# Patient Record
Sex: FEMALE | Race: WHITE | NOT HISPANIC OR LATINO | Employment: OTHER | URBAN - METROPOLITAN AREA
[De-identification: names, ages, dates, MRNs, and addresses within clinical notes are randomized per-mention and may not be internally consistent; named-entity substitution may affect disease eponyms.]

---

## 2017-01-05 ENCOUNTER — DOCUMENTATION ONLY (OUTPATIENT)
Dept: NEUROLOGY | Facility: CLINIC | Age: 42
End: 2017-01-05

## 2017-01-05 NOTE — PROGRESS NOTES
Received Brain MRI (9/15/16) [facility] on 1/5/2016.  Placed in AP folder for review

## 2017-01-10 ENCOUNTER — TELEPHONE (OUTPATIENT)
Dept: NEUROLOGY | Facility: CLINIC | Age: 42
End: 2017-01-10

## 2017-01-10 NOTE — TELEPHONE ENCOUNTER
----- Message from Elsie Jules sent at 1/9/2017  4:04 PM CST -----  Contact: Pt [phone]  Pt is returning Licha's call.

## 2017-01-12 ENCOUNTER — TELEPHONE (OUTPATIENT)
Dept: NEUROLOGY | Facility: CLINIC | Age: 42
End: 2017-01-12

## 2017-01-12 NOTE — TELEPHONE ENCOUNTER
"Received notes from Dr. Higgins from office visit with patient on 1/11/17.  Dr. Higgins states that he will follow up with Dr. Juan after our next visit with Ant on 2/1/16. He states "If demyelinating workup is negative, I will consider sending a paraneoplastic antibody panel."     Celiac panel negative.   Myasthena gravis panel negative.   "

## 2017-01-31 ENCOUNTER — TELEPHONE (OUTPATIENT)
Dept: NEUROLOGY | Facility: CLINIC | Age: 42
End: 2017-01-31

## 2017-01-31 NOTE — TELEPHONE ENCOUNTER
----- Message from Corinne Zeng sent at 1/31/2017 10:56 AM CST -----  Contact: cecile (mother) @ [phone] or [phone]  Pt is scheduled to f/u with dr wynn on tomorrow.  Pt is not feeling well and would like to reschedule for Monday morning if possible.  pls call with a new appt.

## 2017-02-01 ENCOUNTER — TELEPHONE (OUTPATIENT)
Dept: NEUROLOGY | Facility: CLINIC | Age: 42
End: 2017-02-01

## 2017-02-01 ENCOUNTER — OFFICE VISIT (OUTPATIENT)
Dept: NEUROLOGY | Facility: CLINIC | Age: 42
End: 2017-02-01
Payer: COMMERCIAL

## 2017-02-01 VITALS
BODY MASS INDEX: 21.38 KG/M2 | HEIGHT: 58 IN | DIASTOLIC BLOOD PRESSURE: 89 MMHG | SYSTOLIC BLOOD PRESSURE: 113 MMHG | WEIGHT: 101.88 LBS

## 2017-02-01 DIAGNOSIS — H53.2 DOUBLE VISION: Primary | ICD-10-CM

## 2017-02-01 DIAGNOSIS — R41.89 SUBJECTIVE MEMORY COMPLAINTS: ICD-10-CM

## 2017-02-01 DIAGNOSIS — R26.9 GAIT DISTURBANCE: ICD-10-CM

## 2017-02-01 DIAGNOSIS — R47.89 WORD FINDING DIFFICULTY: ICD-10-CM

## 2017-02-01 DIAGNOSIS — H55.00 NYSTAGMUS: ICD-10-CM

## 2017-02-01 PROCEDURE — 99215 OFFICE O/P EST HI 40 MIN: CPT | Mod: S$GLB,,, | Performed by: PSYCHIATRY & NEUROLOGY

## 2017-02-01 PROCEDURE — 99999 PR PBB SHADOW E&M-EST. PATIENT-LVL III: CPT | Mod: PBBFAC,,, | Performed by: PSYCHIATRY & NEUROLOGY

## 2017-02-01 RX ORDER — CHOLECALCIFEROL (VITAMIN D3) 25 MCG
10000 TABLET ORAL DAILY
COMMUNITY
End: 2019-03-20 | Stop reason: DRUGHIGH

## 2017-02-01 NOTE — PROGRESS NOTES
Subjective:      ·  Patient ID: Ant Martinez is a 41 y.o. female who presents today for possible MS. She is accompanied by both aunts and her mother.   She continues to have sx of fatigue, memory difficulties, double vision, depression, and some word finding difficulties.  She is now taking 10,000 IU of vitamin D3 daily.     Her aunt states that her speech improves a lot (normalizes) after she's had two alcoholic beverages    SOCIAL HISTORY  Social History   Substance Use Topics    Smoking status: Never Smoker    Smokeless tobacco: Never Used    Alcohol use No     Living arrangements - the patient lives with her parents.  Employment [employer]       Objective:      25 foot timed walk: 7.4 seconds; wide based;     Neurologic Exam    MENTAL STATUS: grossly intact  CRANIAL NERVE EXAM: There is mild rotatory nystagmus on right gaze that extinguishes. Extraocular   muscles are intact.  No facial asymmetry.There is no dysarthria.   MOTOR EXAM: Normal bulk and tone throughout UE and LE bilaterally. Rapid sequential movements are normal. Strength is 5/5 in all groups   in the lower extremities and upper extremities.   REFLEXES: Symmetric and 2+ throughout in all 4 extremities.   SENSORY EXAM: Normal to vibration t/o  COORDINATION: Normal finger-to-nose exam.   GAIT: very wide based; unusual; slow;     Imaging:   MRI brain with one 5mm lesion left temporal WM. Non-enhancing.  Mike positive    Labs:     Lab Results   Component Value Date    KETXPDAC27DP 13 (L) 12/27/2016         DIAGNOSIS  1. Double vision and nystagmus  2. Symptoms of cognitive dysfunction / dysphasia that are intermittent    DDX  Autoimmune encephalopathy, MS (doubt), anxiety    RECOMMENDATION  1. repeat MRI brain in March 2. Refer to Dr. Bateman  3. PT and ST therapy  4. NP with Dr. Cummins  5. Will send autoimmune encephalopathy profile    Over 50% of this 40 minute visit was spent in direct face to face counseling of the patient, her mother, and  her aunts about my diagnostic impression, and the rationale for w/u         Double vision  -     Encephalopathy Autoimmune Eval; Future; Expected date: 2/1/17  -     MRI Brain W WO Contrast; Future  -     NMO AQUAPORIN-4-IGG, SERUM; Future; Expected date: 2/1/17  -     Ambulatory Referral to Ophthalmology    Nystagmus  -     Encephalopathy Autoimmune Eval; Future; Expected date: 2/1/17  -     MRI Brain W WO Contrast; Future  -     NMO AQUAPORIN-4-IGG, SERUM; Future; Expected date: 2/1/17  -     Ambulatory Referral to Ophthalmology    Subjective memory complaints  -     Encephalopathy Autoimmune Eval; Future; Expected date: 2/1/17  -     MRI Brain W WO Contrast; Future  -     NMO AQUAPORIN-4-IGG, SERUM; Future; Expected date: 2/1/17  -     Ambulatory Referral to Neuropsychology  -     Ambulatory Referral to Speech Therapy    Word finding difficulty  -     Encephalopathy Autoimmune Eval; Future; Expected date: 2/1/17  -     MRI Brain W WO Contrast; Future  -     NMO AQUAPORIN-4-IGG, SERUM; Future; Expected date: 2/1/17  -     Ambulatory Referral to Neuropsychology  -     Ambulatory Referral to Speech Therapy    Gait disturbance  -     Ambulatory Referral to Physical/Occupational Therapy

## 2017-02-01 NOTE — Clinical Note
"STEVEN Oleary referring this pt to you.  She had new c/o of word finding difficulty and memory issues since Sept.  Work-up so far unrevealing, but I have not totally ruled out "organic" etiology (I think possible, but becoming less likely).  Her aunt told me today that her dysphasia improves after 2 martinis. She has a significant psych history as well.   Thanks  "

## 2017-02-06 ENCOUNTER — INITIAL CONSULT (OUTPATIENT)
Dept: OPHTHALMOLOGY | Facility: CLINIC | Age: 42
End: 2017-02-06
Payer: COMMERCIAL

## 2017-02-06 DIAGNOSIS — H50.15 ALTERNATING EXOTROPIA: ICD-10-CM

## 2017-02-06 DIAGNOSIS — H50.42 MONOFIXATION SYNDROME: ICD-10-CM

## 2017-02-06 PROCEDURE — 99999 PR PBB SHADOW E&M-EST. PATIENT-LVL II: CPT | Mod: PBBFAC,,, | Performed by: OPHTHALMOLOGY

## 2017-02-06 PROCEDURE — 92004 COMPRE OPH EXAM NEW PT 1/>: CPT | Mod: S$GLB,,, | Performed by: OPHTHALMOLOGY

## 2017-02-06 RX ORDER — ALPRAZOLAM 0.5 MG/1
TABLET ORAL
Refills: 1 | COMMUNITY
Start: 2016-12-23

## 2017-02-06 RX ORDER — DEXMETHYLPHENIDATE HYDROCHLORIDE 30 MG/1
1 CAPSULE, EXTENDED RELEASE ORAL EVERY MORNING
Refills: 0 | COMMUNITY
Start: 2017-01-09 | End: 2019-03-20

## 2017-02-06 RX ORDER — METHYLPHENIDATE HYDROCHLORIDE 54 MG/1
54 TABLET ORAL EVERY MORNING
Refills: 0 | COMMUNITY
Start: 2016-11-23 | End: 2017-02-06

## 2017-02-06 RX ORDER — ONDANSETRON 4 MG/1
TABLET, FILM COATED ORAL
Refills: 0 | COMMUNITY
Start: 2017-01-19 | End: 2019-03-20 | Stop reason: DRUGHIGH

## 2017-02-06 RX ORDER — PROCHLORPERAZINE MALEATE 10 MG
TABLET ORAL
Refills: 0 | COMMUNITY
Start: 2016-11-16 | End: 2017-02-06

## 2017-02-06 NOTE — LETTER
February 6, 2017      Deedee Juan MD  [address]  [facility] [address]           [facility] Ophthalmology  [address]  [facility] [address]  Phone: [phone]  Fax: [phone]          Patient: Ant Maritnez   MR Number: [id number]   YOB: 1975   Date of Visit: 2/6/2017       Dear Dr. Deedee Juan:    Thank you for referring Ant Martinez to me for evaluation. Attached you will find relevant portions of my assessment and plan of care.    If you have questions, please do not hesitate to call me. I look forward to following Ant Martinez along with you.    Sincerely,    Jens Bateman MD    Enclosure  CC:  No Recipients    If you would like to receive this communication electronically, please contact [email or web address] or [phone] to request more information on [employer] Link access.    For providers and/or their staff who would like to refer a patient to [facility], please contact us through our one-stop-shop provider referral line, [facility], at [phone].    If you feel you have received this communication in error or would no longer like to receive these types of communications, please e-mail [email or web address]

## 2017-02-06 NOTE — MR AVS SNAPSHOT
[facility]  [address]  [facility] [address]  Phone: [phone]  Fax: [phone]                  Ant Martinez   2017 10:00 AM   Initial consult    Description:  Female : 1975   Provider:  Jens Bateman MD   Department:  [facility]           Reason for Visit     Concerns About Ocular Health           Diagnoses this Visit        Comments    Monofixation syndrome         Alternating exotropia                To Do List           Future Appointments        Provider Department Dept Phone    3/17/2017 9:45 AM [facility] MRI2 [facility] [phone]    3/24/2017 9:00 AM Deedee Juan MD [facility]- Multiple Sclerosis [phone]      Goals (5 Years of Data)     None      Follow-Up and Disposition     Return in about 1 month (around 3/6/2017).      [facility]     [facility] Nurse Care Line -  Assistance  Registered nurses in the [facility] provide clinical advisement, health education, appointment booking, and other advisory services.  Call for this free service at [phone].             Medications           Message regarding Medications     Verify the changes and/or additions to your medication regime listed below are the same as discussed with your clinician today.  If any of these changes or additions are incorrect, please notify your healthcare provider.        STOP taking these medications     prochlorperazine (COMPAZINE) 10 MG tablet TAKE ONE TABLET BY MOUTH THREE TIMES A DAY if needed FOR HEADACHE WITH NAUSEA    methylphenidate (CONCERTA) 54 MG CR tablet Take 54 mg by mouth every morning.           Verify that the below list of medications is an accurate representation of the medications you are currently taking.  If none reported, the list may be blank. If incorrect, please contact your healthcare provider. Carry this list with you in case of emergency.           Current Medications     alprazolam (XANAX)  0.5 MG tablet TAKE ONE TABLET BY MOUTH THREE TIMES A DAY if needed    dexmethylphenidate (FOCALIN XR) 30 mg 24 hr capsule Take 1 capsule by mouth every morning.    lamotrigine (LAMICTAL) 25 MG tablet Take 25 mg by mouth once daily.    lisinopril (PRINIVIL,ZESTRIL) 20 MG tablet Take 20 mg by mouth once daily.    ondansetron (ZOFRAN) 4 MG tablet TAKE ONE TABLET BY MOUTH EVERY 4 HOURS if needed FOR NAUSEA & VOMITING    quetiapine (SEROQUEL) 25 MG Tab Take by mouth.    vitamin D 1000 units Tab Take 10,000 mg by mouth once daily.           Clinical Reference Information           Allergies as of 2/6/2017     No Known Allergies      Immunizations Administered on Date of Encounter - 2/6/2017     None      [facility] Sign-Up     Activating your [facility] account is as easy as 1-2-3!     1) Visit [email or web address], select Sign Up Now, enter this activation code and your date of birth, then select Next.  [email or web address]  Expires: 3/18/2017 10:15 AM      2) Create a username and password to use when you visit [facility] in the future and select a security question in case you lose your password and select Next.    3) Enter your e-mail address and click Sign Up!    Additional Information  If you have questions, please e-mail [email or web address] or call [phone] to talk to our [facility] staff. Remember, [facility] is NOT to be used for urgent needs. For medical emergencies, dial 911.         Instructions    Temporary prism.  Return in one month.       Language Assistance Services     ATTENTION: Language assistance services are available, free of charge. Please call [phone].      ATENCIÓN: Si habla español, tiene a terry disposición servicios gratuitos de asistencia lingüística. Llame al [phone].     ARGELIA Ý: N?u b?n nói Ti?ng Vi?t, có các d?ch v? h? tr? ngôn ng? mi?n phí dành cho b?n. G?i s? [phone].         [facility] complies with applicable Federal civil rights laws and does not discriminate on  the basis of race, color, national origin, age, disability, or sex.

## 2017-02-06 NOTE — PROGRESS NOTES
HPI     Concerns About Ocular Health    Additional comments: Double vision           Comments   Referred by    Patient states experiencing double vision side by side since 09/12/2016   which seem to be constant. Pt states had a few episodes of double vision   before but seem to have improved quickly.  Have experience some headaches   not sure if its her eyes.  No pain.    I have personally interviewed the patient, reviewed the history and   examined the patient and agree with the technician's exam.    The diplopia is intermittent, binocular, and horizontal. The degree of   double vision is not affected by direction of gaze. The degree of   intensity varies. She has a history of a lazy eye and wore a patch for a   while. Her eyes may wander at times.       Last edited by Jens Bateman MD on 2/6/2017 10:50 AM. (History)        ROS     Positive for: Neurological, Cardiovascular, Eyes    Negative for: Constitutional, Gastrointestinal, Skin, Genitourinary,   Musculoskeletal, HENT, Endocrine, Respiratory, Psychiatric, Allergic/Imm,   Heme/Lymph    Last edited by Jens Bateman MD on 2/6/2017 10:33 AM. (History)        Assessment /Plan     For exam results, see Encounter Report.    Monofixation syndrome    Alternating exotropia      Prescription for 4 diopter base in Fresnel prism over left eye. Return in one month.

## 2017-03-03 ENCOUNTER — TELEPHONE (OUTPATIENT)
Dept: NEUROLOGY | Facility: CLINIC | Age: 42
End: 2017-03-03

## 2017-03-03 ENCOUNTER — OFFICE VISIT (OUTPATIENT)
Dept: OPHTHALMOLOGY | Facility: CLINIC | Age: 42
End: 2017-03-03
Payer: COMMERCIAL

## 2017-03-03 DIAGNOSIS — H50.15 ALTERNATING EXOTROPIA: Primary | ICD-10-CM

## 2017-03-03 PROCEDURE — 99499 UNLISTED E&M SERVICE: CPT | Mod: S$GLB,,, | Performed by: OPHTHALMOLOGY

## 2017-03-03 PROCEDURE — 99999 PR PBB SHADOW E&M-EST. PATIENT-LVL II: CPT | Mod: PBBFAC,,, | Performed by: OPHTHALMOLOGY

## 2017-03-03 RX ORDER — LITHIUM CARBONATE 300 MG/1
300 CAPSULE ORAL DAILY
Refills: 0 | Status: ON HOLD | COMMUNITY
Start: 2017-02-07 | End: 2018-09-22 | Stop reason: HOSPADM

## 2017-03-03 NOTE — MR AVS SNAPSHOT
[facility]  [address]  [facility] [address]  Phone: [phone]  Fax: [phone]                  Ant Martinez   3/3/2017 10:30 AM   Office Visit    Description:  Female : 1975   Provider:  Jens Bateman MD   Department:  [facility]           Reason for Visit     Concerns About Ocular Health           Diagnoses this Visit        Comments    Alternating exotropia    -  Primary            To Do List           Future Appointments        Provider Department Dept Phone    3/17/2017 9:45 AM [facility] MRI2 [facility] [phone]    3/24/2017 9:00 AM Deedee Juan MD [facility]- Multiple Sclerosis [phone]      Goals (5 Years of Data)     None      Follow-Up and Disposition     Return if symptoms worsen or fail to improve.      [facility]     [facility] Nurse Care [facility] -  Assistance  Registered nurses in the [facility] provide clinical advisement, health education, appointment booking, and other advisory services.  Call for this free service at [phone].             Medications           Message regarding Medications     Verify the changes and/or additions to your medication regime listed below are the same as discussed with your clinician today.  If any of these changes or additions are incorrect, please notify your healthcare provider.             Verify that the below list of medications is an accurate representation of the medications you are currently taking.  If none reported, the list may be blank. If incorrect, please contact your healthcare provider. Carry this list with you in case of emergency.           Current Medications     alprazolam (XANAX) 0.5 MG tablet TAKE ONE TABLET BY MOUTH THREE TIMES A DAY if needed    dexmethylphenidate (FOCALIN XR) 30 mg 24 hr capsule Take 1 capsule by mouth every morning.    lamotrigine (LAMICTAL) 25 MG tablet Take 25 mg by mouth once daily.    lisinopril  (PRINIVIL,ZESTRIL) 20 MG tablet Take 20 mg by mouth once daily.    lithium (ESKALITH) 300 MG capsule Take 300 mg by mouth once daily.    ondansetron (ZOFRAN) 4 MG tablet TAKE ONE TABLET BY MOUTH EVERY 4 HOURS if needed FOR NAUSEA & VOMITING    quetiapine (SEROQUEL) 25 MG Tab Take by mouth.    vitamin D 1000 units Tab Take 10,000 mg by mouth once daily.           Clinical Reference Information           Allergies as of 3/3/2017     No Known Allergies      Immunizations Administered on Date of Encounter - 3/3/2017     None      [facility] Sign-Up     Activating your [facility] account is as easy as 1-2-3!     1) Visit [email or web address], select Sign Up Now, enter this activation code and your date of birth, then select Next.  [email or web address]  Expires: 3/18/2017 10:15 AM      2) Create a username and password to use when you visit [facility] in the future and select a security question in case you lose your password and select Next.    3) Enter your e-mail address and click Sign Up!    Additional Information  If you have questions, please e-mail [email or web address] or call [phone] to talk to our [facility] staff. Remember, [facility] is NOT to be used for urgent needs. For medical emergencies, dial 911.         Instructions    Try prism as now placed.  Come in if the prism works and you need a new eyeglass prescription.       Language Assistance Services     ATTENTION: Language assistance services are available, free of charge. Please call [phone].      ATENCIÓN: Si habla español, tiene a terry disposición servicios gratuitos de asistencia lingüística. Llame al [phone].     ARGELIA Ý: N?u b?n nói Ti?ng Vi?t, có các d?ch v? h? tr? ngôn ng? mi?n phí dành cho b?n. G?i s? [phone].         [facility] complies with applicable Federal civil rights laws and does not discriminate on the basis of race, color, national origin, age, disability, or sex.

## 2017-03-03 NOTE — PROGRESS NOTES
HPI     Concerns About Ocular Health    Additional comments: 1 month           Comments   DLS:02/06/2017 Bhavana  Patient here for 1 month follow up using 4 diopter base in Fresnel prism   over left eye.  Pt states only been using for 2 weeks, but haven't notice a change in   vision.  Often nausea and headaches.    I have personally interviewed the patient, reviewed the history and   examined the patient and agree with the technician's exam.    Prism checked, placed base out rather than in.    I have personally interviewed the patient, reviewed the history and   examined the patient and agree with the technician's exam.       Last edited by Jens Bateman MD on 3/3/2017 12:13 PM. (History)        ROS     Positive for: Neurological, Cardiovascular, Eyes    Negative for: Constitutional, Gastrointestinal, Skin, Genitourinary,   Musculoskeletal, HENT, Endocrine, Respiratory, Psychiatric, Allergic/Imm,   Heme/Lymph    Last edited by Jens Bateman MD on 3/3/2017 11:44 AM. (History)        Assessment /Plan     For exam results, see Encounter Report.    Alternating exotropia      The prism was place on the left eyeglass lens base out rather than the prescribed base in. I reversed the base and will let her try it again. With base in on the Fresnel, she was aligned.

## 2017-03-03 NOTE — TELEPHONE ENCOUNTER
2nd Request - Please call patient to schedule CONS with Dr. Cummins(provider specific) patient came into the office on 3/3/2017 to check the status of her appointment.     ThanksEmelia

## 2017-03-13 ENCOUNTER — TELEPHONE (OUTPATIENT)
Dept: NEUROLOGY | Facility: CLINIC | Age: 42
End: 2017-03-13

## 2017-03-17 ENCOUNTER — HOSPITAL ENCOUNTER (OUTPATIENT)
Dept: RADIOLOGY | Facility: HOSPITAL | Age: 42
Discharge: HOME | End: 2017-03-17
Attending: PSYCHIATRY & NEUROLOGY
Payer: COMMERCIAL

## 2017-03-17 DIAGNOSIS — H55.00 NYSTAGMUS: ICD-10-CM

## 2017-03-17 DIAGNOSIS — H53.2 DOUBLE VISION: ICD-10-CM

## 2017-03-17 DIAGNOSIS — R41.89 SUBJECTIVE MEMORY COMPLAINTS: ICD-10-CM

## 2017-03-17 DIAGNOSIS — R47.89 WORD FINDING DIFFICULTY: ICD-10-CM

## 2017-03-17 PROCEDURE — 70553 MRI BRAIN STEM W/O & W/DYE: CPT | Mod: 26,,, | Performed by: RADIOLOGY

## 2017-03-17 PROCEDURE — 25500020 PHARM REV CODE 255: Performed by: PSYCHIATRY & NEUROLOGY

## 2017-03-17 PROCEDURE — A9585 GADOBUTROL INJECTION: HCPCS | Performed by: PSYCHIATRY & NEUROLOGY

## 2017-03-17 PROCEDURE — 70553 MRI BRAIN STEM W/O & W/DYE: CPT | Mod: TC

## 2017-03-17 RX ORDER — GADOBUTROL 604.72 MG/ML
5 INJECTION INTRAVENOUS
Status: COMPLETED | OUTPATIENT
Start: 2017-03-17 | End: 2017-03-17

## 2017-03-17 RX ADMIN — GADOBUTROL 5 ML: 604.72 INJECTION INTRAVENOUS at 10:03

## 2017-03-20 ENCOUNTER — TELEPHONE (OUTPATIENT)
Dept: NEUROLOGY | Facility: CLINIC | Age: 42
End: 2017-03-20

## 2017-03-20 NOTE — TELEPHONE ENCOUNTER
I spoke with Ant today and informed her that although she will be put on the wait list to see Dr. Cummins, that right now Dr. Cummins would not be able to be seen until early May. Yris says she is scheduled on 5/5 at 8:30 and will notify the patient today. Pt is okay with canceling her 3/24 appt with Dr. Juan and rescheduling after her appt with Dr. Cummins.

## 2017-03-21 NOTE — TELEPHONE ENCOUNTER
Dr. Juan   Can I put Ant on your schedule for 5/8/2017 at 2:40? Post Dr Cummins visit. This slot was filled but patient canceled. 5/8 was originally your resident's Clinic day

## 2017-03-23 NOTE — TELEPHONE ENCOUNTER
Left VM for patient to give our office a call to schedule a f/u appointment with our office after her appointment with Dr Cummins on 5/5/17

## 2017-04-19 ENCOUNTER — TELEPHONE (OUTPATIENT)
Dept: NEUROLOGY | Facility: CLINIC | Age: 42
End: 2017-04-19

## 2017-05-05 ENCOUNTER — INITIAL CONSULT (OUTPATIENT)
Dept: NEUROLOGY | Facility: CLINIC | Age: 42
End: 2017-05-05
Payer: COMMERCIAL

## 2017-05-05 DIAGNOSIS — F44.9 CONVERSION DISORDER: ICD-10-CM

## 2017-05-05 DIAGNOSIS — R41.89 SUBJECTIVE MEMORY COMPLAINTS: ICD-10-CM

## 2017-05-05 DIAGNOSIS — F31.32 BIPOLAR AFFECTIVE DISORDER, CURRENTLY DEPRESSED, MODERATE: ICD-10-CM

## 2017-05-05 PROCEDURE — 99499 UNLISTED E&M SERVICE: CPT | Mod: S$GLB,,, | Performed by: PSYCHIATRY & NEUROLOGY

## 2017-05-05 PROCEDURE — 96119 PR NEUROPSYCH TESTING BY TECHNICIAN: CPT | Mod: 59,S$GLB,, | Performed by: PSYCHIATRY & NEUROLOGY

## 2017-05-05 PROCEDURE — 96118 PR NEUROPSYCH TESTING BY PSYCH/PHYS: CPT | Mod: S$GLB,,, | Performed by: PSYCHIATRY & NEUROLOGY

## 2017-05-05 PROCEDURE — 90791 PSYCH DIAGNOSTIC EVALUATION: CPT | Mod: S$GLB,,, | Performed by: PSYCHIATRY & NEUROLOGY

## 2017-05-09 NOTE — PROGRESS NOTES
NEUROPSYCHOLOGICAL EVALUATION - CONFIDENTIAL    REFERRAL SOURCE: Deedee Juan MD  MEDICAL NECESSITY:  Evaluate cognitive to determine strengths and weaknesses and assess for impairment.    DATE CONDUCTED: 5/55/2017    SOURCES OF INFORMATION:  The following was gathered from a clinical interview with Ms. Ant Martinez, a separate interview with her mother, and review of the available medical records. Ms. Martinez expressed an understanding of the purpose of the evaluation and consented to all procedures. Total licensed billing psychologists professional time including clinical interview, test administration and interpretation of tests administered by the billing psychologist, integration of test results and other clinical data, preparing the final report, and personally reporting results to the patient  82532 - 1 hour  [id number] - 4 hours   Total technician time (85304) - 3 hours      HISTORY OF PRESENT ILLNESS: Ms. Ant Martinez is a 41-year-old, right-handed,  female with 18 years of education who was referred for a neuropsychological evaluation in the setting of cognitive complaints and, to this point, multiple medically unexplained symptoms. She reportedly experienced double vision and dizziness to the extent that she was unable to walk while at work on 9/12/2016. She also vomited. Her mother picked her up from work, took her to her PCP, and a CT scan was performed that was WNL. A MRI was ordered on 9/14/2016 remarkable for multiple white matter lesions with differentials including demyelinating disease, small vessel ischemic change, and migraine-related white matter change. She has remained symptomatic since that time with other diagnostic testing finding no evidence of demyelinating disease in cervical spine, thoracic spine, or optic pathways. Work up for MS mimics has been largely negative and LP had negative MS profile. Encephalopathy panel was negative.     Ms. Martinez continues to report a myriad of symptoms  since the initial episode, including intermittent double vision, slurred speech (which reportedly improves with 2 glasses of alcohol), dizziness, unsteady gait, and cognitive dysfunction. She is reportedly symptomatic on a daily basis, even noting that she is unable to see for about 2 hours every morning. However, after further clarification, she indicated that she is able to see well enough to perform household activities but she has trouble reading. In regards to cognition, Ms. Martinez reported longstanding difficulties with organization/planning and sustained attention. She tends to become hyperfocused while reading, which has been a longstanding trait. She was on Focalin for a period of time in adulthood. 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 Ms. Martinez described very strong visual memory at baseline.     Of note, both Ms. Martinez and her mother indicated that she was experiencing a high level of anxiety well before the aforementioned episode. In fact, her mother believes that she has been decompensating for at least the past 2 years. Her daily functioning has been especially compromised since moving from [place] to [facility] 1-2 years ago. Ms. Hansen functioning began further deteriorating after she started a job as a  in August, 2016. She was struggling to keep up with her work demands as she had to teach in 3 different classrooms, which was very difficult for an individual who admittedly struggles with organization/planning. Ms. Martinez reportedly received some constructive criticism from a supervisor and had what she called a breakdown at work. The aforementioned episode and symptom onset occurred a week later.    Ms. Hansen daily functioning has been compromised since the aforementioned episode. She is now working only 2 hours per day as a , teaching back to back classes in the  afternoon. She lives independently lives in a house owned by her parents. Ms. Martinez independently fills her pillbox and reported strong medication compliance. Her mother schedules her appointments, manages her finances, and drives her to work and medical appointments. She does not cook and states that she is unable to eat in the morning before work. She does not eat until she gets home from work at 4pm and has another meal around 8/830pm.     DEVELOPMENT/ACADEMIC/VOCATIONAL HISTORY: Ms. Maritnez thinks that she was born a few weeks late, but met developmental milestones on time. She was always a strong reader and did well academically in elementary school. However, she began experiencing social challenges around middle school, especially after transferring to a new school. She made 1 friend during that time, but generally felt like an outcast until she graduated high school. Ms. Martinez indicated that she began experiencing anxiety and depression while in high school, but her academics remained strong. She went to [employer] on a scholarship, but impulsively left after 1 year to move in with a boyfriend. She worked at a bank for a period of time. She moved in with her parents about a year and a half later and obtained a Bachelors degree from [employer]. She indicated that she didnt work for a period of time after graduation.     Ms. Martinez then moved to [place], worked towards a Masters degree in deaf education/special education at [employer], and worked as a  part-time. 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 She held several teaching positions, but she did not stay in any position beyond 1-2 years. 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 Ms. Martinez returned to [employer] in the late 2000s and obtained a second Masters degree in education. She returned to work at [employer] after finishing  the second Masters degree. She noted that she was placed on FMLA at one point due to depression. She returned to [facility] to live with her parents and was unable to find a job for a period of time. 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 She is currently working 2 hours per day and is exhausted by the end of it due to feeling as though she is standing and performing the entire class.     SUBSTANCE USE HISTORY: Ms. Martinez endorsed past alcohol and marijuana abuse and initially denied other illicit drug abuse. However, she later noted that she used cocaine on a regular basis from about [id number]. She quit smoking cigarettes in her early 20s. She reported binge drinking during episodes of dajuan. She denied current abuse and drinks alcohol infrequently.     ADDITIONAL MEDICAL HISTORY: Otherwise unremarkable    Impressions offered from a brain MRI in March, 2017 includes: The brain appears stable from prior exam, again demonstrating a few scattered punctate foci of T2/FLAIR signal hyperintensity in the supratentorial white matter which are very nonspecific in distribution.    CURRENT MEDICATIONS:    alprazolam (XANAX) 0.5 MG tablet     lamotrigine (LAMICTAL) 25 MG tablet    lisinopril (PRINIVIL,ZESTRIL) 20 MG tablet    lithium (ESKALITH) 300 MG capsule     ondansetron (ZOFRAN) 4 MG tablet    quetiapine (SEROQUEL) 25 MG Tab     vitamin D 1000 units Tab      PSYCHIATRIC HISTORY: Premorbid history of depression, anxiety, and bipolar disorder. As noted, she began experiencing anxiety and depression in adolescence. She indicated that she has always been highly sensitive to criticism, even constructive criticism. Ms. Martinez reported multiple manic episodes, highlighted by reduced need for sleep, elevated mood, paranoid thinking, and excessive involvement in high risk activities (buying sprees, sexual indiscretions). She recalled at least 2 recent  manic episodes in 2013 and 2015. She has been followed by a psychiatrist for the past 2 years. She also saw a psychiatrist in her early 20s at the insistence of her mother. She has never been engaged in psychotherapy despite persistent recommendations from providers and her mother.  She is worried that she would not be truthful if she were to engage in therapy. Ms. Martinez denied a history of active suicidal ideation, plan, or intent, although she did seem to endorse passive suicidal ideation. She endorsed her mother as a protective factor.     FAMILY HISTORY: Unremarkable     BEHAVIORAL OBSERVATIONS:  Ms. Martinez arrived on time to the evaluation and was accompanied by her mother. She was appropriately dressed and groomed. She appeared younger than her stated age. No motor or sensory problems were noted that would compromise the results of the evaluation. Speech was of normal rate, volume, and prosody. Expressive and receptive language were grossly intact. Ms. Martinez was a fair historian, but she had difficulty providing time frames and/or a linear account of events. She was at times hesitant to disclose possibly undesirable aspects of her history, such as her cocaine use.     TEST RESULTS: Ms. Hansen scores on stand-alone and embedded measures of performance validity were well below established cut-offs, suggestive of inadequate and inconsistent task engagement/effort. As a result, the findings are believed to be an underestimation of her current cognitive abilities. Similarly, her responses on a self-report measure were suggestive of significant over-reporting to the extent that the findings were considered invalid.      Single word reading was superior. Estimated baseline abilities were above average. Confrontation naming was below average, semantic verbal fluency was borderline impaired, and processing speed was borderline impaired to impaired. Auditory attention/encoding, working memory, divided attention/set  shifting, and letter verbal fluency were impaired. Ms. Hansen copy of a complex figure was impaired. She demonstrated poor organization/planning in addition to making multiple omission and commission errors. Encoding of a supraspan word list was impaired. She demonstrated perfect retention following a long delay, but her overall recall was borderline impaired. Recognition was impaired.     Ms. Martinez completed a comprehensive self-report inventory designed to measure a wide range of psychopathology. Her profile was considered invalid due to excessive overreporting of symptoms in multiple domains of functioniong. She endorsed a well above average number of symptoms that are rarely described by individuals with severe psychopathology. Similarly, she reported a well above average number of somatic symptoms that are rarely described by individuals with genuine medical conditions. She also endorsed an unusual combination of cognitive dysfunction, suggestive of non-credible memory complaints.      SUMMARY AND IMPRESSIONS: Ms. Ant Martinez is a 41-year-old female who was referred for a neuropsychological evaluation in the setting of cognitive complaints and, to this point, multiple medically unexplained symptoms. The onset of her complaints (including dizziness, gait disturbance, diplopia, and cognitive dysfunction) coincide with starting a stressful job in August, 2016. She is currently working 2 hours per day. She is functional in basic ADLS with her mother managing most complex activities of daily living, including driving her to work on a daily basis.     Ms. Hansen test scores are not a valid and reliable measure of her current cognitive abilities secondary to inconsistent/suboptimal effort. She also endorsed an unusual pattern of cognitive dysfunction that was suggestive of non-credible memory complaints. Her daily functioning is clearly compromised, but she was unable to provide sufficient effort/engagement to provide  "valid cognitive test results.     In addition to suboptimal effort on cognitive testing, Ms. Hansen responses on a self-report inventory were considered invalid due to substantial symptom overreporting. This finding indicates that her symptom report to providers may be inaccurate and not reflective of her current functioning. As a result, it will be important for providers to rely on objective testing when making diagnostic considerations.     With that being said, Ms. Hansen daily functioning is clearly compromised, likely related to multiple factors. It is encouraging that her medical work up has been largely unremarkable. There are several likely factors contributing to her current level of functioning, includin. Longstanding inattention/disorganization  2. Suboptimally controlled psychiatric symptoms   3. Psychosocial stressors    If a medical condition is ruled out, it seems that the onset of Ms. Hansen condition was due to a confluence of factors. Her mother noted that she had been decompensating from a psychiatric standpoint for the past 2 years. She then started a job that highlighted her baseline weakness in disorganization, resulting in poor performance. This led to negative feedback from her supervisor, which resulted in a breakdown while at school. Her physical symptoms started shortly after this breakdown and have persisted since that time.     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 Bipolar Disorder  3. Attention Deficit/Hyperactivity Disorder    RECOMMENDATIONS:    1.  Psychotherapy - Weekly psychotherapy at a minimum. However, Ms. Martinez would likely benefit from participating in an intensive outpatient program (IOP) that offers daily treatment.     2. Psychiatry - Consistent follow up.     3. Optimize Brain Health - Physical and social activity. Maintain a heart healthy diet.     4. Vocational Planning - Ms. Martinez finds "standing and performing" " exhausting, which is a necessary aspect of teaching. She also seems very anxious prior to teaching as she will not eat before her classes (which are in the afternoon). She may benefit from considering alternative career options, especially as she has been successful in other positions in the past (promoted to manager at [employer]).     5. Neuropsychology Follow-up - 1-2 years following consistent treatment of mood. An earlier evaluation may be warranted if Ms. Martinez experiences a sudden change in cognitive status.     Thank you for allowing me to participate in Ms. Hansen care.  If you have any questions, please contact me at [phone].    Anirudh Cummins Psy.D., ABPP  Board Certified in Clinical Neuropsychology  [facility] - Department of Neurology    APPENDIX  TESTS ADMINISTERED:  Clinical Interview and Review of Records, Test of Premorbid Functioning (TOPF), WMT, Wechsler Adult Intelligence Scale - 4th Edition (WAIS-IV), Castañeda Verbal Learning Test - Revised (HVLT-R), Ronn Complex Figure (copy trial only), Trailmaking Test Part A and B, Controlled Oral Word Association Test (COWAT), Semantic Verbal Fluency (Animals), Naming Test from the NAB, Symbol Digit Modalities Test, Sotelo Depression Inventory - second edition (BDI-2), and the MMPI-2-RF.    TOPF    Standard Score  + simple demographics  122 (predicted = 115)     WAIS-IV      Individual subtests  Scaled Score  Digit Span   4   LDF   4 (raw)   LDB   4 (raw)   LDS   4 (raw)  RDS   7     HVLT    T scores  T1    5 (raw)  T2    7 (raw)  T3    7 (raw)  Total    27  DR    35 (raw = 7)  Retention   55 (100%)  Recog    <20 (9/12 hits, 4 false positive)    Ronn Complex Figure  Percentile  Copy    <1% (21.5/36)   Time    2-5% (362 seconds)                              [facility] Norms   T-Score  Trails A   13  Trails B   14 (1 error)  FAS    32  Animal fluency   24    NAB     T-Score  Naming   37  (29/31)    SDMT    Z-Score  Written    -1.54  Oral    -1.76    WMT      IR    62.5  DR    60.0  CNS    57.5  MC    35.0  PA    20.0  FR    25.0

## 2017-05-12 ENCOUNTER — DOCUMENTATION ONLY (OUTPATIENT)
Dept: NEUROLOGY | Facility: CLINIC | Age: 42
End: 2017-05-12

## 2017-05-12 NOTE — PROGRESS NOTES
Fax received from Dr Higgins's office with office notes from 3/13/17. Placed in Dr Juan's folder for review.

## 2017-05-16 ENCOUNTER — DOCUMENTATION ONLY (OUTPATIENT)
Dept: NEUROLOGY | Facility: CLINIC | Age: 42
End: 2017-05-16

## 2017-05-16 NOTE — PROGRESS NOTES
NEUROPSYCHOLOGICAL EVALUATION - CONFIDENTIAL  FEEDBACK NOTE    On 5/16/17, I provided Ms. Ant Martinez and her mother the results of her neuropsychological evaluation. Please see her full report for a comprehensive overview of the findings. She was provided a copy of the report and invited to call with additional questions.      Anirudh Cummins Psy.D., CARROLLP  Board Certified in Clinical Neuropsychology  [facility] - Department of Neurology

## 2017-07-12 ENCOUNTER — OFFICE VISIT (OUTPATIENT)
Dept: NEUROLOGY | Facility: CLINIC | Age: 42
End: 2017-07-12
Payer: COMMERCIAL

## 2017-07-12 VITALS
DIASTOLIC BLOOD PRESSURE: 83 MMHG | BODY MASS INDEX: 21.62 KG/M2 | HEIGHT: 58 IN | SYSTOLIC BLOOD PRESSURE: 111 MMHG | WEIGHT: 103 LBS | HEART RATE: 80 BPM

## 2017-07-12 DIAGNOSIS — Z71.9 COUNSELING NOS(V65.40): ICD-10-CM

## 2017-07-12 PROCEDURE — 99215 OFFICE O/P EST HI 40 MIN: CPT | Mod: S$GLB,,, | Performed by: PSYCHIATRY & NEUROLOGY

## 2017-07-12 PROCEDURE — 99999 PR PBB SHADOW E&M-EST. PATIENT-LVL III: CPT | Mod: PBBFAC,,, | Performed by: PSYCHIATRY & NEUROLOGY

## 2017-07-12 RX ORDER — LEVOTHYROXINE SODIUM 25 UG/1
TABLET ORAL
COMMUNITY
Start: 2017-07-06 | End: 2019-03-20 | Stop reason: DRUGHIGH

## 2017-07-12 NOTE — PROGRESS NOTES
"Subjective:       Patient ID: Ant Martinez is a 41 y.o. female who presents today for multiple neurologic symptoms. She is accompanied by her mother.     She states that "a lot of the time I'm much better". She is not working. She feels that her balance is better. Walking is better.  She has some blurry vision in the morning; Has started driving.     Feels a fair amount of stress and anxiety recently;     She had NP testing with Dr. Cummins;  She has reviewed this with Dr. Cummins    SOCIAL HISTORY  Social History   Substance Use Topics    Smoking status: Never Smoker    Smokeless tobacco: Never Used    Alcohol use No     Living arrangements - the patient lives with her parents.  Employment N/A      Objective:      Neurologic Exam   deferred       Imaging:   MRI brain 3/2017 stable from prior; no new lesions; only a few nonspecific punctate lesions    Labs:     Lab Results   Component Value Date    SIRWCNOQ51FR 13 (L) 12/27/2016     Lab Visit on 02/01/2017   Component Date Value    Encephalopathy, Interpre* 02/16/2017 SEE BELOW     NMDA-R Ab CBA, Serum 02/16/2017 Negative     Neuronal (V-G) K+ Channe* 02/16/2017 0.00     Glutamic Acid Decarb Ab 02/16/2017 0.00     BEVERLY-B-R Ab CBA, Serum 02/16/2017 Negative     AMPA-R Ab CBA, Serum 02/16/2017 Negative     PAVAL ISAK-1, Serum 02/16/2017 Negative     PAVAL reflex test added 02/16/2017 None.     PAVAL ISAK-2, Serum 02/16/2017 Negative     PAVAL ISAK-3, Serum 02/16/2017 Negative     PAVAL AGNA-1, Serum 02/16/2017 Negative     PAVAL, PCA-1, Serum 02/16/2017 Negative     PAVAL, PCA-2, Serum 02/16/2017 Negative     PAVAL, PCA-Tr, Serum 02/16/2017 Negative     PAVAL,  Amphiphysin Ab, * 02/16/2017 Negative     N-Type Calcium Channel Ab 02/16/2017 0.00     P/Q Type Calcium Channel* 02/16/2017 0.00     AChR Binding Ab, Serum 02/16/2017 0.00     AChR Ganglionic Neuronal* 02/16/2017 0.00     CRMP-5-IgG WB, Serum 02/16/2017 Negative     NMO/AQP4 FACS,S " 02/03/2017 Negative          Diagnosis     1. Suspect conversion disorder  2. History anxiety  3. History bipolar disorder      Discussion   The additional lab testing and brain MRI failed to reveal any new abnormality.  NP testing suggested findings suggestive of anxiety and likely conversion, and this diagnosis is supported by the lack of any objective abnormality on lab testing. Pt and her mother are both very open to this diagnosis, and pt has (reluctantly) agreed to start regular counseling locally.  She will f/u with us prn.       Over 50% of this 40 minute visit was spent in direct face to face counseling of the patient and her mother about the newest lab testing, and the NP testing done by Dr. Cummins      There are no diagnoses linked to this encounter.

## 2018-09-20 PROBLEM — I10 HTN (HYPERTENSION): Status: ACTIVE | Noted: 2018-09-20

## 2018-09-20 PROBLEM — F31.9 BIPOLAR DISORDER: Status: ACTIVE | Noted: 2018-09-20

## 2018-09-20 PROBLEM — R26.0 ATAXIC GAIT: Status: ACTIVE | Noted: 2018-09-20

## 2018-09-20 PROBLEM — R27.0 ATAXIA: Chronic | Status: ACTIVE | Noted: 2018-09-20

## 2018-09-20 PROBLEM — F41.1 GENERALIZED ANXIETY DISORDER: Status: ACTIVE | Noted: 2018-09-20

## 2019-09-18 ENCOUNTER — OFFICE VISIT (OUTPATIENT)
Dept: URGENT CARE | Facility: CLINIC | Age: 44
End: 2019-09-18
Payer: MEDICARE

## 2019-09-18 VITALS
DIASTOLIC BLOOD PRESSURE: 93 MMHG | OXYGEN SATURATION: 100 % | HEIGHT: 58 IN | RESPIRATION RATE: 20 BRPM | TEMPERATURE: 98 F | HEART RATE: 92 BPM | SYSTOLIC BLOOD PRESSURE: 140 MMHG | BODY MASS INDEX: 20.15 KG/M2 | WEIGHT: 96 LBS

## 2019-09-18 DIAGNOSIS — H65.02 ACUTE SEROUS OTITIS MEDIA OF LEFT EAR, RECURRENCE NOT SPECIFIED: Primary | ICD-10-CM

## 2019-09-18 DIAGNOSIS — R11.15 NON-INTRACTABLE CYCLICAL VOMITING WITH NAUSEA: ICD-10-CM

## 2019-09-18 DIAGNOSIS — J01.10 ACUTE FRONTAL SINUSITIS, RECURRENCE NOT SPECIFIED: ICD-10-CM

## 2019-09-18 PROCEDURE — 99213 OFFICE O/P EST LOW 20 MIN: CPT | Mod: S$GLB,,, | Performed by: INTERNAL MEDICINE

## 2019-09-18 PROCEDURE — 99213 PR OFFICE/OUTPT VISIT, EST, LEVL III, 20-29 MIN: ICD-10-PCS | Mod: S$GLB,,, | Performed by: INTERNAL MEDICINE

## 2019-09-18 RX ORDER — TRAZODONE HYDROCHLORIDE 100 MG/1
TABLET ORAL
COMMUNITY
Start: 2019-09-15

## 2019-09-18 RX ORDER — PREDNISONE 10 MG/1
10 TABLET ORAL DAILY
Qty: 5 TABLET | Refills: 0 | Status: SHIPPED | OUTPATIENT
Start: 2019-09-18 | End: 2019-09-23

## 2019-09-18 RX ORDER — LITHIUM CARBONATE 600 MG/1
CAPSULE ORAL
Refills: 1 | COMMUNITY
Start: 2019-09-09

## 2019-09-18 RX ORDER — LITHIUM CARBONATE 300 MG/1
CAPSULE ORAL
Refills: 0 | COMMUNITY
Start: 2019-09-03

## 2019-09-18 RX ORDER — AMOXICILLIN AND CLAVULANATE POTASSIUM 875; 125 MG/1; MG/1
1 TABLET, FILM COATED ORAL 2 TIMES DAILY
Qty: 20 TABLET | Refills: 0 | Status: SHIPPED | OUTPATIENT
Start: 2019-09-18 | End: 2019-09-28

## 2019-09-18 NOTE — PROGRESS NOTES
"Subjective:       Patient ID: Ant Martinez is a 44 y.o. female.    Vitals:  height is 4' 10" (1.473 m) and weight is 43.5 kg (96 lb). Her tympanic temperature is 97.5 °F (36.4 °C). Her blood pressure is 140/93 (abnormal) and her pulse is 92. Her respiration is 20 and oxygen saturation is 100%.     Chief Complaint: Nausea    Nausea   This is a new problem. The current episode started in the past 7 days (2 days). The problem occurs intermittently. The problem has been resolved. Associated symptoms include coughing, nausea and vomiting. Pertinent negatives include no abdominal pain, anorexia, arthralgias, change in bowel habit, chest pain, chills, congestion, diaphoresis, fatigue, fever, headaches, joint swelling, myalgias, neck pain, numbness, rash, sore throat, swollen glands, urinary symptoms, vertigo, visual change or weakness. Nothing aggravates the symptoms. She has tried nothing for the symptoms. The treatment provided no relief.       Constitution: Negative for chills, sweating, fatigue and fever.   HENT: Negative for ear pain, congestion, sinus pain, sinus pressure, sore throat and voice change.    Neck: Negative for neck pain and painful lymph nodes.   Cardiovascular: Negative for chest pain.   Eyes: Positive for blurred vision. Negative for eye redness.   Respiratory: Positive for cough. Negative for chest tightness, sputum production, bloody sputum, COPD, shortness of breath, stridor, wheezing and asthma.    Gastrointestinal: Positive for nausea and vomiting. Negative for abdominal pain.   Genitourinary: Negative.    Musculoskeletal: Negative for joint pain, joint swelling and muscle ache.   Skin: Negative for rash.   Allergic/Immunologic: Negative for seasonal allergies and asthma.   Neurological: Positive for dizziness. Negative for history of vertigo, headaches and numbness.   Hematologic/Lymphatic: Negative for swollen lymph nodes.   Psychiatric/Behavioral: Negative.        Objective:    "   Physical Exam   Constitutional: She is oriented to person, place, and time. She appears well-developed and well-nourished. She is cooperative.  Non-toxic appearance. She does not appear ill. No distress.   HENT:   Head: Normocephalic and atraumatic.   Right Ear: Hearing, tympanic membrane, external ear and ear canal normal. Tympanic membrane is not injected and not erythematous.   Left Ear: Hearing, external ear and ear canal normal. Tympanic membrane is injected and erythematous.   Nose: Mucosal edema and rhinorrhea present. No nasal deformity. No epistaxis. Right sinus exhibits frontal sinus tenderness. Right sinus exhibits no maxillary sinus tenderness. Left sinus exhibits frontal sinus tenderness. Left sinus exhibits no maxillary sinus tenderness.   Mouth/Throat: Uvula is midline. Mucous membranes are dry. No trismus in the jaw. Normal dentition. No uvula swelling. Posterior oropharyngeal erythema present.       Eyes: Conjunctivae and lids are normal. No scleral icterus.   Sclera clear bilat   Neck: Trachea normal, full passive range of motion without pain and phonation normal. Neck supple.   Cardiovascular: Normal rate, regular rhythm, S1 normal, S2 normal, normal heart sounds, intact distal pulses and normal pulses.   No murmur heard.  Pulmonary/Chest: Effort normal and breath sounds normal. No accessory muscle usage. No respiratory distress. She has no decreased breath sounds. She has no wheezes. She has no rhonchi. She has no rales.   Abdominal: Soft. Normal appearance and bowel sounds are normal. She exhibits no distension. There is no tenderness.   Musculoskeletal: Normal range of motion. She exhibits no edema or deformity.   Neurological: She is alert and oriented to person, place, and time. She exhibits normal muscle tone. Coordination normal.   Skin: Skin is warm, dry and intact. She is not diaphoretic. No pallor.   Psychiatric: She has a normal mood and affect. Her speech is normal and behavior is  normal. Judgment and thought content normal. Cognition and memory are normal.   Nursing note and vitals reviewed.      Assessment:       1. Acute serous otitis media of left ear, recurrence not specified    2. Acute frontal sinusitis, recurrence not specified    3. Non-intractable cyclical vomiting with nausea        Plan:         Acute serous otitis media of left ear, recurrence not specified  -     amoxicillin-clavulanate 875-125mg (AUGMENTIN) 875-125 mg per tablet; Take 1 tablet by mouth 2 (two) times daily. for 10 days  Dispense: 20 tablet; Refill: 0  -     predniSONE (DELTASONE) 10 MG tablet; Take 1 tablet (10 mg total) by mouth once daily. for 5 doses  Dispense: 5 tablet; Refill: 0    Acute frontal sinusitis, recurrence not specified  -     amoxicillin-clavulanate 875-125mg (AUGMENTIN) 875-125 mg per tablet; Take 1 tablet by mouth 2 (two) times daily. for 10 days  Dispense: 20 tablet; Refill: 0  -     predniSONE (DELTASONE) 10 MG tablet; Take 1 tablet (10 mg total) by mouth once daily. for 5 doses  Dispense: 5 tablet; Refill: 0    Non-intractable cyclical vomiting with nausea    take meds

## 2019-09-19 NOTE — PATIENT INSTRUCTIONS
